# Patient Record
Sex: FEMALE
[De-identification: names, ages, dates, MRNs, and addresses within clinical notes are randomized per-mention and may not be internally consistent; named-entity substitution may affect disease eponyms.]

---

## 2020-01-30 ENCOUNTER — HOSPITAL ENCOUNTER (OUTPATIENT)
Dept: HOSPITAL 92 - L&D/OP | Age: 35
Discharge: HOME | End: 2020-01-30
Attending: OBSTETRICS & GYNECOLOGY
Payer: OTHER GOVERNMENT

## 2020-01-30 VITALS — BODY MASS INDEX: 23.6 KG/M2

## 2020-01-30 VITALS — SYSTOLIC BLOOD PRESSURE: 111 MMHG | TEMPERATURE: 98.5 F | DIASTOLIC BLOOD PRESSURE: 56 MMHG

## 2020-01-30 DIAGNOSIS — Z3A.40: ICD-10-CM

## 2020-01-30 DIAGNOSIS — O99.89: Primary | ICD-10-CM

## 2020-01-30 DIAGNOSIS — O48.0: ICD-10-CM

## 2020-01-30 DIAGNOSIS — O09.523: ICD-10-CM

## 2020-01-30 DIAGNOSIS — N89.8: ICD-10-CM

## 2020-01-30 PROCEDURE — 99282 EMERGENCY DEPT VISIT SF MDM: CPT

## 2020-01-30 NOTE — CON
DATE OF CONSULTATION:  2020



PRIMARY OB:  Dr. Mayra Cortez.



CHIEF COMPLAINT:  Bloody show.



HISTORY OF PRESENT ILLNESS:  The patient is a 35-year-old, G2, P1 female with an

intrauterine pregnancy at 40 weeks and 0 days, presenting to Labor and Delivery with

concerns for bloody show.  The patient reports that she has had on couple occasions

today noted some mucusy bloody discharge with wiping that she had felt come out.

She denies any bleeding like a period or persistent bleeding.  She denies leakage of

fluid.  She reports she has been having increasing intensity of her contractions and

also increasing frequency.  The patient at this point is scheduled for a repeat

 tomorrow should she not enter active labor on her own.  The patient denies

any recent illness, fever, fall, headache, chest pain, shortness of breath, nausea,

vomiting, diarrhea, constipation, hip problems, knee problems, muscle weakness, or

any new rashes.  Vaginal bleeding per HPI.  No urinary urgency or frequency. 



PAST MEDICAL HISTORY:  Negative.



PAST SURGICAL HISTORY:   for nonreassuring fetal heart tones.



ALLERGIES:  NO KNOWN DRUG ALLERGIES.



MEDICATIONS:  Prenatal vitamins.



SOCIAL HISTORY:  Denies drug, alcohol, or tobacco use.



OB LABS:  Blood type is B positive.  Antibody screen is negative.  VDRL is

nonreactive.  Hepatitis B surface antigen is nonreactive.  HIV is negative.  GC and

chlamydia are negative.  She is rubella immune.  Third trimester HIV is negative.

Third trimester VDRL is negative.  She is GBS negative. 



REVIEW OF SYSTEMS:  Per HPI.



PHYSICAL EXAMINATION:

VITAL SIGNS:  Blood pressure is 111/56, heart rate of 87, saturating 99% on room

air, temperature 98.5. 

GENERAL:  She appears to be in no acute distress.  She is alert and oriented,

cooperative and pleasant to interact with. 

HEAD:  Normocephalic and atraumatic. 

LUNGS:  Clear to auscultation bilaterally. 

HEART:  Has a regular rate and rhythm. 

ABDOMEN:  Gravid, soft, and nontender in between contractions. 

EXTREMITIES:  Nontender.  Nonedematous. 

:  Per nursing staff, cervix is 1, 50, -2 station. 



Fetal heart tracing shows the fetus with a baseline in the 130s with moderate

long-term variability, positive 15 x 15 accelerations, no decelerations.

Contractions about every 2 to 4 minutes apart. 



ASSESSMENT AND PLAN:  The patient is a 35-year-old female with an intrauterine

pregnancy at 40 weeks, here with some bloody show.  She has evidence of latent

labor, which would be a good reason for the bloody show she is describing.  Fetus

has a category 1 tracing and reactive NST.  Reassurance has been given to the

patient.  She has been given the option of pain medication for temporary pain relief

versus staying in a walking for few hours or staying here in a reclined position for

couple hours to get rechecked or to go home and return as needed.  The patient has

opted to go home, where she can be more comfortable.  She has been given term labor

precautions and instructions to return should she have increased intensity in

frequency of contractions, leakage of fluid for concerns of rupture of membranes,

bleeding like a period, or other concerns related to this pregnancy.  The patient is

being discharged home.  She has an appointment tomorrow for a scheduled repeat

, which is to be performed should the patient not enter labor on her own. 







Job ID:  [id number]

## 2020-01-31 ENCOUNTER — HOSPITAL ENCOUNTER (INPATIENT)
Dept: HOSPITAL 92 - L&D/OP | Age: 35
LOS: 2 days | Discharge: HOME | End: 2020-02-02
Attending: OBSTETRICS & GYNECOLOGY | Admitting: OBSTETRICS & GYNECOLOGY
Payer: OTHER GOVERNMENT

## 2020-01-31 VITALS — BODY MASS INDEX: 23.6 KG/M2

## 2020-01-31 DIAGNOSIS — O48.0: ICD-10-CM

## 2020-01-31 DIAGNOSIS — Z80.3: ICD-10-CM

## 2020-01-31 DIAGNOSIS — O34.211: Primary | ICD-10-CM

## 2020-01-31 DIAGNOSIS — Z3A.40: ICD-10-CM

## 2020-01-31 LAB
HBSAG INDEX: 0.25 S/CO (ref 0–0.99)
HGB BLD-MCNC: 12 G/DL (ref 12–16)
MCH RBC QN AUTO: 30.8 PG (ref 27–31)
MCV RBC AUTO: 93.8 FL (ref 78–98)
PLATELET # BLD AUTO: 309 THOU/UL (ref 130–400)
RBC # BLD AUTO: 3.9 MILL/UL (ref 4.2–5.4)
SYPHILIS ANTIBODY INDEX: 0.04 S/CO
WBC # BLD AUTO: 14.5 THOU/UL (ref 4.8–10.8)

## 2020-01-31 PROCEDURE — 99282 EMERGENCY DEPT VISIT SF MDM: CPT

## 2020-01-31 PROCEDURE — 86901 BLOOD TYPING SEROLOGIC RH(D): CPT

## 2020-01-31 PROCEDURE — 99285 EMERGENCY DEPT VISIT HI MDM: CPT

## 2020-01-31 PROCEDURE — 85027 COMPLETE CBC AUTOMATED: CPT

## 2020-01-31 PROCEDURE — 36415 COLL VENOUS BLD VENIPUNCTURE: CPT

## 2020-01-31 PROCEDURE — 87340 HEPATITIS B SURFACE AG IA: CPT

## 2020-01-31 PROCEDURE — 86850 RBC ANTIBODY SCREEN: CPT

## 2020-01-31 PROCEDURE — 88302 TISSUE EXAM BY PATHOLOGIST: CPT

## 2020-01-31 PROCEDURE — 86900 BLOOD TYPING SEROLOGIC ABO: CPT

## 2020-01-31 PROCEDURE — 51702 INSERT TEMP BLADDER CATH: CPT

## 2020-01-31 PROCEDURE — 86780 TREPONEMA PALLIDUM: CPT

## 2020-01-31 RX ADMIN — DOCUSATE CALCIUM SCH: 240 CAPSULE, LIQUID FILLED ORAL at 21:00

## 2020-01-31 NOTE — PDOC.OPDEL
OB Operative/Delivery Note


Delivery Dr/Surgeon: Dana


Assist: Farrukh


Pre-Delivery Diagnosis: scheduled  section (sterilization)


Procedure/Post Delivery Dx: repeat low transverse CS (and bilateral 

salpingectomy)


Weeks gestation: 40


Anesthesia: spinal





- Findings


  ** A


Sex: male


Apgar - 1 min: 8


Apgar - 5 min: 9





- Additional Findings/Plan


Placenta delivered: spontaneous


 findings: low transverse hysterotomy without extension, normal uterus, 

normal tubes, normal ovaries


Estimated blood loss: 350


Post delivery plan: routine recovery

## 2020-02-01 LAB
HGB BLD-MCNC: 8.9 G/DL (ref 12–16)
MCH RBC QN AUTO: 30.7 PG (ref 27–31)
MCV RBC AUTO: 94.4 FL (ref 78–98)
PLATELET # BLD AUTO: 272 THOU/UL (ref 130–400)
RBC # BLD AUTO: 2.88 MILL/UL (ref 4.2–5.4)
WBC # BLD AUTO: 16.4 THOU/UL (ref 4.8–10.8)

## 2020-02-01 PROCEDURE — 0UB70ZZ EXCISION OF BILATERAL FALLOPIAN TUBES, OPEN APPROACH: ICD-10-PCS | Performed by: OBSTETRICS & GYNECOLOGY

## 2020-02-01 RX ADMIN — HYDROCODONE BITARTRATE AND ACETAMINOPHEN PRN TAB: 5; 325 TABLET ORAL at 14:45

## 2020-02-01 RX ADMIN — SIMETHICONE PRN MG: 80 TABLET, CHEWABLE ORAL at 09:45

## 2020-02-01 RX ADMIN — Medication SCH ML: at 03:39

## 2020-02-01 RX ADMIN — SIMETHICONE PRN MG: 80 TABLET, CHEWABLE ORAL at 21:06

## 2020-02-01 RX ADMIN — DOCUSATE CALCIUM SCH MG: 240 CAPSULE, LIQUID FILLED ORAL at 09:45

## 2020-02-01 RX ADMIN — Medication SCH: at 02:41

## 2020-02-01 RX ADMIN — DOCUSATE CALCIUM SCH MG: 240 CAPSULE, LIQUID FILLED ORAL at 21:03

## 2020-02-01 RX ADMIN — HYDROCODONE BITARTRATE AND ACETAMINOPHEN PRN TAB: 5; 325 TABLET ORAL at 18:51

## 2020-02-01 RX ADMIN — SIMETHICONE PRN MG: 80 TABLET, CHEWABLE ORAL at 00:44

## 2020-02-01 NOTE — OP
DATE OF PROCEDURE:  2020



PREOPERATIVE DIAGNOSES:  

1. Intrauterine pregnancy at 40 weeks and 1 day.

2. Prior  section x1, declines trial of labor.

3. Desires sterilization.

4. Family history of breast cancer.



POSTOPERATIVE DIAGNOSES:  

1. Intrauterine pregnancy at 40 weeks and 1 day.

2. Prior  section x1, declines trial of labor.

3. Desires sterilization.

4. Family history of breast cancer.



PROCEDURE PERFORMED:  Repeat low transverse  section and bilateral

salpingectomy via Pfannenstiel skin incision. 



ANESTHESIA:  Spinal.



ESTIMATED BLOOD LOSS:  350 mL.



ASSISTANT SURGEON:  Sarah Chadwick PA-C



COMPLICATIONS:  None.



DRAINS:  [place] catheter.



PATHOLOGY:  Bilateral fallopian tubes.



FINDINGS:  Male infant, cephalic presentation, clear amniotic fluid, Apgars of 8 and

9.  Weight is pending.  Hysterotomy without extension.  Normal uterus, ovaries, and

tubes bilaterally and excellent hemostasis. 



DESCRIPTION OF PROCEDURE:  The patient was taken to the operating room, where spinal

anesthesia was obtained without difficulty.  The patient was prepped and draped in a

sterile fashion in dorsal supine position with leftward tilt.  After ensuring

adequacy of anesthesia, a Pfannenstiel skin incision was made and carried down to

the underlying subcutaneous tissue with a knife.  The patient's old keloid scar was

also removed by placing Allis clamps on the scar and incising with a knife.  The

fascia was nicked in the midline with a knife and carried laterally with the Vásquez

scissors.  The superior aspect of the fascia was tented with 2 Kochers and dissected

off the rectus with Vásquez scissors.  The inferior aspect of the fascia was tented

with 2 Kochers and dissected off the rectus with the Vásquez's down to the pubic

symphysis.  The peritoneum was bluntly entered into and manually retracted.  The

James O retractor was placed.  The vesicouterine peritoneum was incised with the

Metzenbaum's.  The lower uterine segment was incised in a transverse fashion and

extended with a Lobato maneuver.  The infant's head was brought to the hysterotomy and

delivered with fundal pressure.  The body was delivered and an infant cord was

clamped and infant handed to awaiting Zoltan team.  The cord blood was obtained.  The

placenta was allowed to spontaneously deliver.  The uterus was then cleared of all

clots and debris and repaired with a #1 Monocryl in a running locking fashion with

excellent hemostasis.  The lap was placed over the hysterotomy and the uterus was

exteriorized.  The right fallopian tube was grasped with the [place] clamp and a

window was made in the mesosalpinx and between the spiral arteries.  The spiral

arteries were then clamped with hemostats as well as the medial fallopian tube with

the uterine cornua.  These were incised with the Metzenbaums and fallopian tube was

handed off for pathology.  The pedicles were then ligated as well as the fallopian

tube with 2-0 chromic and hemostasis was noted to be excellent.  The same procedure

was performed on the contralateral side and again noting hemostasis.  The uterus was

placed back into the abdomen.  The hysterotomy was irrigated as well as the

fallopian tube sites.  Hemostasis was noted.  All instruments were removed out the

abdomen.  The rectus muscles were examined and noted to be hemostatic.  The fascia

was reapproximated with 0 PDS x1 suture with excellent reapproximation.  The

subcutaneous tissue was irrigated and cauterized of any bleeders and reapproximated

with a 2-0 plain gut in a running fashion.  The skin was closed with 4-0 Monocryl in

subcuticular fashion.  Dermabond was applied as well as a pressure dressing.  The

patient tolerated the procedure well.  Sponge, lap, and needle counts correct x2.

The patient was taken to recovery room in stable condition.  The patient received

Ancef 2 g prior to the procedure. 







Job ID:  [id number]

## 2020-02-01 NOTE — PDOC.PP
Post Partum Progress Note


Post Partum Day #: POD1


Subjective: 


Resting, c/o only mild shoulder pain.


PO intake tolerated: yes


Flatus: no


Ambulation: no


 Vital Signs (12 hours)











  Temp Pulse Resp BP Pulse Ox


 


 01/31/20 19:46  98.6 F  88  16  105/56 L  98


 


 01/31/20 19:45      98


 


 01/31/20 17:25  98.6 F  66  16  99/57 L  95


 


 01/31/20 13:15   86  18  94/50 L 








 Weight











Weight                         56.699 kg

















- Physical Examination


General: NAD


Respiratory: non-labored breathing


Abdominal: no distention, appropriately TTP


Skin: CS incision dry & intact


Neurological: no gross focal deficits


Psychiatric: normal affect


Result Diagrams: 


 01/31/20 05:11





Additional Labs: 


 Post Partum Labs











Blood Type  B POSITIVE   01/31/20  05:41    


 


Hep Bs Antigen  Non-Reactive S/CO (NonReactive)   01/31/20  05:11    














- Assessment/Plan


Doing well s/p C/S.


May ask for meds for pain as needed, like subdiaphramatic air as etiology


Israel out.


Clears and advance.


Routine post op care.

## 2020-02-02 VITALS — TEMPERATURE: 98.6 F | SYSTOLIC BLOOD PRESSURE: 104 MMHG | DIASTOLIC BLOOD PRESSURE: 55 MMHG

## 2020-02-02 RX ADMIN — Medication SCH: at 09:51

## 2020-02-02 RX ADMIN — Medication SCH: at 01:23

## 2020-02-02 RX ADMIN — DOCUSATE CALCIUM SCH MG: 240 CAPSULE, LIQUID FILLED ORAL at 09:49

## 2020-02-02 RX ADMIN — HYDROCODONE BITARTRATE AND ACETAMINOPHEN PRN TAB: 5; 325 TABLET ORAL at 05:02

## 2020-02-02 RX ADMIN — HYDROCODONE BITARTRATE AND ACETAMINOPHEN PRN TAB: 5; 325 TABLET ORAL at 00:27

## 2020-02-02 NOTE — PDOC.PP
Post Partum Progress Note


Post Partum Day #: 2


Subjective: 





Patient doing well. No significant overnight events. Patient does endorse 

incision pain and pain with ambulation. However, both are tolerable. Patient 

states lochia less than period. She desires to go home today. 


PO intake tolerated: yes


Flatus: yes


Ambulation: yes


 Vital Signs (12 hours)











  Temp Pulse Resp BP Pulse Ox


 


 20 19:30  98.1 F  97  18  89/50 L  98


 


 20 17:00  98.5 F  101 H  18  101/52 L 








 Weight











Weight                         56.699 kg

















- Physical Examination


General: NAD


Cardiovascular: RRR


Respiratory: non-labored breathing


Abdominal: + bowel sounds, no distention, appropriately TTP


Fundus firm & at: just below umbilicus


Extremities: negative homans (B)


Skin: no rash


Deviation from normal: Right side of incision with poor approximation. No 

leaking from sight. 


Neurological: no gross focal deficits


Psychiatric: A&Ox3, normal affect


Result Diagrams: 


 20 05:27





Additional Labs: 


 Post Partum Labs











Blood Type  B POSITIVE   20  05:41    


 


Hep Bs Antigen  Non-Reactive S/CO (NonReactive)   20  05:11    











(1) S/P repeat low transverse 


Code(s): Z98.891 - HISTORY OF UTERINE SCAR FROM PREVIOUS SURGERY   Status: 

Acute   





(2) Term pregnancy delivered


Code(s): O80 - ENCOUNTER FOR FULL-TERM UNCOMPLICATED DELIVERY   Status: Acute   





- Assessment/Plan





Routine PP care


- s/p rLTCS, POD #2


- Meeting PP milestones


- Rubella immune, Rh positive


- Incision clean, dry. Right side of incision not well approximated. No 

evidence of seroma or oozing from sight. Precautions provided.





Dispo: Patient doing well. Plan for d/c home today.